# Patient Record
Sex: MALE | Race: WHITE | NOT HISPANIC OR LATINO | URBAN - NONMETROPOLITAN AREA
[De-identification: names, ages, dates, MRNs, and addresses within clinical notes are randomized per-mention and may not be internally consistent; named-entity substitution may affect disease eponyms.]

---

## 2019-10-29 NOTE — PATIENT DISCUSSION
Cataract surgery has been performed in the first eye and activities of daily living are still impaired. The patient would like to proceed with cataract surgery in the second eye as scheduled. The patient elects Symfony Toric IOL Emmetropia target OS.

## 2020-01-02 ENCOUNTER — IMPORTED ENCOUNTER (OUTPATIENT)
Dept: URBAN - NONMETROPOLITAN AREA CLINIC 1 | Facility: CLINIC | Age: 45
End: 2020-01-02

## 2020-01-02 PROBLEM — H52.13: Noted: 2020-01-02

## 2020-01-02 PROBLEM — H52.223: Noted: 2020-01-02

## 2020-01-02 PROCEDURE — 92015 DETERMINE REFRACTIVE STATE: CPT

## 2020-01-02 PROCEDURE — 92004 COMPRE OPH EXAM NEW PT 1/>: CPT

## 2020-05-20 NOTE — PROCEDURE NOTE: SURGICAL
Prior to commencing surgery patient identification, surgical procedure, site, and side were confirmed by Dr. Lupis Talbert. Following topical proparacaine anesthesia, the patient was positioned at the YAG laser, a contact lens coupled to the cornea of the right eye with methylcellulose and an axial posterior capsulotomy performed without complication using 3.2 Mj x 21. Attention was then turned to the left eye and a contact lens coupled to the cornea of the left eye with methylcellulose and an axial posterior capsulotomy performed without complication using 3.2 Mj x 19.&nbsp; Excess methylcellulose was washed from both eyes, one drop of Alphagan instilled in each eye and the patient returned to the holding area having tolerated the procedure well and without complication. <br />MRN: [id number]<br /><br />

## 2020-05-27 NOTE — PATIENT DISCUSSION
5/27/2020 full disc with patient feels NVA is not great and DVA is not great.   Ed can try CTL simulation to enh OD for NVA and possibly enh OS for DVA with CTL to see if happy with overall product that way.  This would be a simulation for C enh and would NOT be equal to glasses.

## 2020-05-29 NOTE — PATIENT DISCUSSION
5/29/20 feels takes away too much DVA to have Sx for.  ed leave it as is get DVO sun and NVO for near tasking PRN.

## 2021-04-12 NOTE — PATIENT DISCUSSION
Cataract surgery has been performed in the first eye and activities of daily living are still impaired. The patient would like to proceed with cataract surgery in the second eye as scheduled. The patient elects Symfony Toric IOL Emmetropia target OS. [FreeTextEntry1] : Patient follows up this past Sunday yesterday woke up with his ears feeling somewhat clogged he went to first [facility] he was treated with an antibiotic for presumed left otitis media on examination is ears look fine nasal endoscopy he is fine is already taking Flonase he had his MRI/MRA's done yesterday is waiting for his neurology neurologist to give him an update.  He did describe however that he had some episodes of what appear to be floaters and having suggested that he discuss this with his neurologist as well as consider a ophthalmology evaluation once again he had been already evaluated for blurriness of his vision.  He understands and most likely will go have that checked out.  Seems that he is suffering from a lot of unusual symptoms that may very well be related to Covid and will most likely resolve which is seemingly what is happening but he does need to be worked up since he is an otherwise young healthy individual.

## 2022-04-09 ASSESSMENT — VISUAL ACUITY
OS_SC: 20/20
OD_SC: 20/20

## 2023-05-30 NOTE — PATIENT DISCUSSION
Recommended observation. Albendazole Counseling:  I discussed with the patient the risks of albendazole including but not limited to cytopenia, kidney damage, nausea/vomiting and severe allergy.  The patient understands that this medication is being used in an off-label manner.

## 2024-04-03 NOTE — PATIENT DISCUSSION
Physical Therapy Visit    Visit Type: Daily Treatment Note  Visit: 4  Referring Provider: Lucian Collins MD  Medical Diagnosis (from order): M54.50 - Low back pain, unspecified back pain laterality, unspecified chronicity, unspecified whether sciatica present     SUBJECTIVE                                                                                                               Patient states she is sore from last session and from the shoveling as of recently.      OBJECTIVE                                                                                                                                     Treatment     Therapeutic Exercise  Seated Core Isometrics 5x5 seconds  Seated Core Rotational Isometrics 5x5 seconds  *Seated External Rotation Isometrics 5x5 seconds  *Straight Leg Isometrics 5x5 seconds  Cross Over Walk with a Squat x10  Lunge with Twist x10    Revised Home Exercise Program   *Patient was educated and demonstrated new exercises for Home Exercise Program.  Reviewed Home Exercise Program with form        Manual Therapy   PROM of Hip/knee (Internal Rotation, External Rotation, Flexion, Extension, Abduction)  Muscle Facilitation to muscle belly to:  Core  Hip Extensors  Hip Flexors  Hip Internal Rotators   Hip External Rotators   Hip Abductors     -more motion and less tenderness reported     Skilled input: verbal instruction/cues, tactile instruction/cues, demonstration and facilitation    Writer verbally educated and received verbal consent for hand placement, positioning of patient, and techniques to be performed today from patient for therapist position for techniques and hand placement and palpation for techniques as described above and how they are pertinent to the patient's plan of care.  Home Exercise Program  Access Code: [email or web address]  URL: [email or web address]  Date: 04/03/2024  Prepared by: Javi Iyer    Exercises  - Supine Single Knee to Chest Stretch  Recommended artificial tears to use: 1 drop 4x a day in both eyes.  - 2 x daily - 2 reps - 30 sec hold  - Supine Lower Trunk Rotation  - 2 x daily - 1 sets - 10 reps - 5 sec hold  - Supine Piriformis Stretch Pulling Heel to Hip  - 2 x daily - 2 sets - 30 sec hold  - Seated Lumbar Flexion Stretch  - 2 x daily - 1 sets - 10 reps - 5 sec hold  - Seated Isometric Knee Flexion  - 1 x daily - 7 x weekly - 1-2 sets - 10 reps  - Curl Up with Arms Crossed  - 5 x daily - 7 x weekly - 1 sets - 5 reps - 5 hold  - Diagonal Curl Up with Arms Crossed  - 5 x daily - 7 x weekly - 1 sets - 5 reps - 5 hold  - Seated Hip External Rotation with Resistance  - 5 x daily - 7 x weekly - 1 sets - 5 reps - 5 hold  - Active Straight Leg Raise with Quad Set  - 5 x daily - 7 x weekly - 1 sets - 5 reps - 5 hold  - Crossover Lunge  - 1 x daily - 7 x weekly - 1-2 sets - 10 reps  - Forward Lunge with Rotation  - 1 x daily - 7 x weekly - 1-2 sets - 10 reps  - Seated Trunk Rotation on Swiss Ball with Resistance  - 1 x daily - 7 x weekly - 1-2 sets - 10 reps      ASSESSMENT                                                                                                            Patient more tender today to palpation due to soreness from shoveling. Noted more pain in Low back with Right leg/hip movements than left. Updated Home Exercise Program to involve more hip isometrics to better facilitate rotator muscles. Patient would benefit from cont skilled Physical Therapy services to address deficits with trunk and hip stability, mobility, and strength. Improvement in these areas should result in increased function, achievement of Long Term Goals, and return to prior level of function.    Education:   - Results of above outlined education: Verbalizes understanding and Demonstrates understanding    PLAN                                                                                                                           Suggestions for next session as indicated: Progress per plan of care, see how new isometrics are        Therapy procedure time and total treatment time can be found documented on the Time Entry flowsheet